# Patient Record
Sex: FEMALE | Race: OTHER | Employment: UNEMPLOYED | URBAN - METROPOLITAN AREA
[De-identification: names, ages, dates, MRNs, and addresses within clinical notes are randomized per-mention and may not be internally consistent; named-entity substitution may affect disease eponyms.]

---
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
## 2023-01-06 ENCOUNTER — OFFICE VISIT (OUTPATIENT)
Dept: INTERNAL MEDICINE CLINIC | Age: 18
End: 2023-01-06
Payer: MEDICAID

## 2023-01-06 VITALS
DIASTOLIC BLOOD PRESSURE: 79 MMHG | HEART RATE: 92 BPM | OXYGEN SATURATION: 100 % | RESPIRATION RATE: 16 BRPM | BODY MASS INDEX: 19.65 KG/M2 | WEIGHT: 106.8 LBS | TEMPERATURE: 98 F | HEIGHT: 62 IN | SYSTOLIC BLOOD PRESSURE: 111 MMHG

## 2023-01-06 DIAGNOSIS — Z13.31 DEPRESSION SCREEN: ICD-10-CM

## 2023-01-06 DIAGNOSIS — F41.9 ANXIETY: Primary | ICD-10-CM

## 2023-01-06 DIAGNOSIS — F80.81 STUTTERING: ICD-10-CM

## 2023-01-06 DIAGNOSIS — F32.A DEPRESSION, UNSPECIFIED DEPRESSION TYPE: ICD-10-CM

## 2023-01-06 DIAGNOSIS — F41.0 PANIC ATTACK: ICD-10-CM

## 2023-01-06 RX ORDER — CITALOPRAM 10 MG/1
10 TABLET ORAL DAILY
Qty: 30 TABLET | Refills: 1 | Status: SHIPPED | OUTPATIENT
Start: 2023-01-06

## 2023-01-06 RX ORDER — BUSPIRONE HYDROCHLORIDE 7.5 MG/1
7.5 TABLET ORAL 3 TIMES DAILY
Qty: 120 TABLET | Refills: 1 | Status: SHIPPED | OUTPATIENT
Start: 2023-01-06

## 2023-01-06 NOTE — PROGRESS NOTES
RM 10    VFC Status: NON VFc    Chief Complaint   Patient presents with    Follow-up     Medication check       Visit Vitals  /79 (BP 1 Location: Left upper arm, BP Patient Position: Sitting, BP Cuff Size: Adult)   Pulse 92   Temp 98 °F (36.7 °C) (Oral)   Resp 16   Ht 5' 2.4\" (1.585 m)   Wt 106 lb 12.8 oz (48.4 kg)   SpO2 100%   BMI 19.28 kg/m²         1. Have you been to the ER, urgent care clinic since your last visit? Hospitalized since your last visit? No    2. Have you seen or consulted any other health care providers outside of the [address] [place] [address] since your last visit? Include any pap smears or colon screening. No    Health Maintenance Due   Topic Date Due    COVID-19 Vaccine (5 - Booster for Pfizer series) 12/23/2022       Abuse Screening 1/8/2020   Are there any signs of abuse or neglect? No     Learning Assessment 4/23/2019   PRIMARY LEARNER Mother   HIGHEST LEVEL OF EDUCATION - PRIMARY LEARNER  -   BARRIERS PRIMARY LEARNER -   [address] [place] [address] NAME -   CO-LEARNER HIGHEST LEVEL OF EDUCATION -   Corinne Avina 10 -   PRIMARY LANGUAGE Turkmen   PRIMARY LANGUAGE CO-LEARNER -    NEED -   LEARNER PREFERENCE PRIMARY DEMONSTRATION   LEARNER PREFERENCE CO-LEARNER -   LEARNING SPECIAL TOPICS -   ANSWERED BY mother   RELATIONSHIP SELF       AVS  education, follow up, and recommendations provided and addressed with patient.   services used to advise patient No

## 2023-01-06 NOTE — PROGRESS NOTES
CC:   Chief Complaint   Patient presents with    Follow-up     Medication check       HPI: Jilda Holstein (: 2005) is a 21 y.o. female, established patient, here for evaluation of the following chief complaint(s): fup of depression anxiety     ASSESSMENT/PLAN:    ICD-10-CM ICD-9-CM    1. Anxiety  F41.9 300.00 BEHAV ASSMT W/SCORE & DOCD/STAND INSTRUMENT      busPIRone (BUSPAR) 7.5 mg tablet      citalopram (CELEXA) 10 mg tablet      2. Depression, unspecified depression type  F32. A 311 busPIRone (BUSPAR) 7.5 mg tablet      citalopram (CELEXA) 10 mg tablet      3. Depression screen  Z13.31 V79.0 BEHAV ASSMT W/SCORE & DOCD/STAND INSTRUMENT      4. Panic attack  F41.0 300.01 busPIRone (BUSPAR) 7.5 mg tablet      citalopram (CELEXA) 10 mg tablet      5. Stuttering  F80.81 315.35       6. BMI (body mass index), pediatric, 5% to less than 85% for age  Z76.54 V85.52         /3/4 discussed symptoms at length today as well as medication management and therapy recommendations  Has list of psychiatrists in [place] to  make apt with  Has counselor in college, will be working with [facility] to try to help with schedule load   Refills for medication given   No SI/HI feels better in terms of anxiety, depression about the same  Discussed coping techniques  Encouraged compliance  Discussed eating well, sleeping exercise  Going back to [place] on 23  Went over signs and symptoms that would warrant evaluation in the clinic once again or urgent/emergent evaluation in the ED. Piyush Ra Parent voiced understanding and agreed with plan. 5 going on since elementary school  Mom and MGF and M uncles stutter  Does interfere with socialization, and impacts relationships /affects public speaking  Never did speech  Encouraged today  Referral given but may need to find someone in [place] to continue with     5 The patient and mother were counseled regarding nutrition and physical activity.     Plan and evaluation (above) reviewed with pt/parent(s) at visit  Parent(s) voiced understanding of plan and provided with time to ask/review questions. After Visit Summary (AVS) provided to pt/parent(s) after visit with additional instructions as needed/reviewed. SUBJECTIVE/OBJECTIVE:  Here for fup of depression anxiety panic attacks  Seen by Dr Robin Hobson on 12/29/23  Started on medication which she has been taking for a week now  Sees improvement in her anxiety  Depression not so much   Has been dealing with depression for 6 months to a year now, college and demands have made her anxiety worse  Seeing a therapist in [address] [place] [address] in [facility] 20[facility] but no plan  Denies any Si/HI today  Eating sleeping   Not as active  Wants to do physics , very rigorous  Was doing well academically but now affecting her grades  Stutters  Never did testing/ speech   Has been stuttering since really young,   Mom uncles maternal grandfather stutters  Affects friendships and public speaking  Has mentioned it to counselor    ROS:   No fever, URI symptoms wheezing, shortness of breath, vomiting, abdominal pain or distention,  bowel or bladder problems,  changes in appetite or activity levels, muscle or joint aches,  joint swelling, rashes, petechiae, bruising or other lesions. Rest of 12 point ROS is otherwise negative      Past Medical History:   Diagnosis Date    Asthma     hx of but was told she does not have it now    Costochondritis     Eczema     Failed hearing screening     Gastroenteritis     Ill-defined condition     excema    Keratosis pilaris     Menstrual irregularity     Mild acne      History reviewed. No pertinent surgical history.   Social History     Socioeconomic History    Marital status: SINGLE   Tobacco Use    Smoking status: Never    Smokeless tobacco: Never   Substance and Sexual Activity    Alcohol use: No    Drug use: No    Sexual activity: Never     Family History   Problem Relation Age of Onset    Cancer Maternal Grandmother 46        pancreatic cancer Heart Disease Maternal Grandfather     Other Maternal Grandfather         stuttering     No Known Problems Mother     No Known Problems Father     No Known Problems Sister          OBJECTIVE:   Vitals reviewed  GENERAL: WDWN female in NAD. Appears well hydrated, cap refill < 3sec  EYES: PERRLA, EOMI, no conjunctival injection or icterus. No periorbital edema/erythema   EARS: Normal external ear canals with normal TMs b/l. NOSE: nasal passages clear. MOUTH: OP clear,   NECK: supple, no masses, no cervical lymphadenopathy. RESP: clear to auscultation bilaterally, no w/r/r  CV: RRR, normal L2/E3, no murmurs, clicks, or rubs.   ABD: soft, nontender, no masses  MS:  FROM all joints  SKIN: no rashes or lesions  NEURO: non-focal  PSYCH: appropriate mood and affect, pauses briefly at times when talking to think about the words she wants to say ,     3 most recent PHQ Screens 12/29/2022   Little interest or pleasure in doing things Nearly every day   Feeling down, depressed, irritable, or hopeless Nearly every day   Total Score PHQ 2 6   Trouble falling or staying asleep, or sleeping too much Nearly every day   Feeling tired or having little energy Nearly every day   Poor appetite, weight loss, or overeating Several days   Feeling bad about yourself - or that you are a failure or have let yourself or your family down More than half the days   Trouble concentrating on things such as school, work, reading, or watching TV Nearly every day   Moving or speaking so slowly that other people could have noticed; or the opposite being so fidgety that others notice More than half the days   Thoughts of being better off dead, or hurting yourself in some way Not at all   PHQ 9 Score 20   How difficult have these problems made it for you to do your work, take care of your home and get along with others Extremely difficult   In the past year have you felt depressed or sad most days, even if you felt okay? -   Has there been a time in the past month when you have had serious thoughts about ending your life? -   Have you ever in your whole life, tried to kill yourself or made a suicide attempt? -           On this date 01/06/2023 I have spent 44 minutes discussing with pt and parent her anxiety depression and panic attacks symptoms, prior SI, stuttering, medication management coping techniques, speech evaluation,  reviewing previous notes, test results and face to face with the patient discussing the diagnosis and importance of compliance with the treatment plan as well as documenting on the day of the visit. An electronic signature was used to authenticate this note.   -- Oscar Gannon, DO

## 2023-12-01 NOTE — TELEPHONE ENCOUNTER
----- Message from Florence Cedillo sent at 12/1/2023  9:39 AM CST -----   Name of Who is Calling:     What is the request in detail:  request call back in reference to prior authorization for medication oxyCODONE-acetaminophen (PERCOCET)  mg per tablet / questionnaire was sent on 12/27/23 / patient  request closes out tomorrow morning  or will have to appeal   / may need to call and state if patient is getting brand or generic Please contact to further discuss and advise      Can the clinic reply by [facility]:     [facility]   [facility] / [facility] / [phone] / case# [id number] / fax# [phone]      PA started 11/05/2020. (KeyAllison Gitelman) - [id number]    Awaiting response from insurance.
